# Patient Record
Sex: MALE | Race: WHITE | NOT HISPANIC OR LATINO | Employment: OTHER | URBAN - METROPOLITAN AREA
[De-identification: names, ages, dates, MRNs, and addresses within clinical notes are randomized per-mention and may not be internally consistent; named-entity substitution may affect disease eponyms.]

---

## 2023-10-25 ENCOUNTER — TELEPHONE (OUTPATIENT)
Dept: PRIMARY CARE | Facility: CLINIC | Age: 78
End: 2023-10-25
Payer: MEDICARE

## 2023-10-25 DIAGNOSIS — I48.11 LONGSTANDING PERSISTENT ATRIAL FIBRILLATION (MULTI): Primary | ICD-10-CM

## 2023-10-27 DIAGNOSIS — I48.21 PERMANENT ATRIAL FIBRILLATION (MULTI): Primary | ICD-10-CM

## 2023-10-31 LAB
NON-UH HIE INR: 1.8
NON-UH HIE PROTIME PATIENT: 20 SECONDS (ref 9.8–12.8)

## 2023-11-28 ENCOUNTER — ANTICOAGULATION - WARFARIN VISIT (OUTPATIENT)
Dept: CARDIOLOGY | Facility: CLINIC | Age: 78
End: 2023-11-28
Payer: MEDICARE

## 2023-11-28 DIAGNOSIS — I48.0 PAROXYSMAL ATRIAL FIBRILLATION (MULTI): ICD-10-CM

## 2023-11-28 LAB
INR IN PPP BY COAGULATION ASSAY EXTERNAL: 2.4
NON-UH HIE INR: 2.4
NON-UH HIE PROTIME PATIENT: 27.4 SECONDS (ref 9.8–12.8)
POC INR: 2.4
POC PROTHROMBIN TIME: NORMAL
PROTHROMBIN TIME (PT) IN PPP BY COAGULATION ASSAY EXTERNAL: NORMAL SECONDS

## 2023-11-28 PROCEDURE — 85610 PROTHROMBIN TIME: CPT | Performed by: INTERNAL MEDICINE

## 2023-12-11 ENCOUNTER — OFFICE VISIT (OUTPATIENT)
Dept: CARDIOLOGY | Facility: CLINIC | Age: 78
End: 2023-12-11
Payer: MEDICARE

## 2023-12-11 VITALS
TEMPERATURE: 97.3 F | WEIGHT: 123 LBS | HEART RATE: 72 BPM | SYSTOLIC BLOOD PRESSURE: 122 MMHG | DIASTOLIC BLOOD PRESSURE: 76 MMHG | HEIGHT: 73 IN | BODY MASS INDEX: 16.3 KG/M2

## 2023-12-11 DIAGNOSIS — R94.31 ABNORMAL EKG: Primary | ICD-10-CM

## 2023-12-11 DIAGNOSIS — I10 PRIMARY HYPERTENSION: ICD-10-CM

## 2023-12-11 DIAGNOSIS — I48.20 CHRONIC ATRIAL FIBRILLATION (MULTI): ICD-10-CM

## 2023-12-11 PROCEDURE — 1126F AMNT PAIN NOTED NONE PRSNT: CPT | Performed by: INTERNAL MEDICINE

## 2023-12-11 PROCEDURE — 3078F DIAST BP <80 MM HG: CPT | Performed by: INTERNAL MEDICINE

## 2023-12-11 PROCEDURE — 3074F SYST BP LT 130 MM HG: CPT | Performed by: INTERNAL MEDICINE

## 2023-12-11 PROCEDURE — 1159F MED LIST DOCD IN RCRD: CPT | Performed by: INTERNAL MEDICINE

## 2023-12-11 PROCEDURE — 99214 OFFICE O/P EST MOD 30 MIN: CPT | Performed by: INTERNAL MEDICINE

## 2023-12-11 PROCEDURE — 1036F TOBACCO NON-USER: CPT | Performed by: INTERNAL MEDICINE

## 2023-12-11 RX ORDER — LOSARTAN POTASSIUM 100 MG/1
100 TABLET ORAL DAILY
COMMUNITY

## 2023-12-11 RX ORDER — AMLODIPINE BESYLATE 10 MG/1
10 TABLET ORAL DAILY
COMMUNITY

## 2023-12-11 RX ORDER — TRIAMCINOLONE ACETONIDE 1 MG/G
1 CREAM TOPICAL 2 TIMES DAILY
COMMUNITY
Start: 2023-06-09

## 2023-12-11 RX ORDER — BISOPROLOL FUMARATE 5 MG/1
5 TABLET, FILM COATED ORAL 2 TIMES DAILY
COMMUNITY
End: 2024-03-19

## 2023-12-11 RX ORDER — TRIAMCINOLONE ACETONIDE 1 MG/G
2 OINTMENT TOPICAL 2 TIMES DAILY
COMMUNITY
Start: 2023-03-10 | End: 2024-02-13 | Stop reason: WASHOUT

## 2023-12-11 ASSESSMENT — PAIN SCALES - GENERAL: PAINLEVEL: 0-NO PAIN

## 2023-12-11 NOTE — PROGRESS NOTES
1. Paroxysmal atrial fibrillation, bisoprolol and warfarin  2. Hypertension  3. Atypical chest pains with negative stress test    Patient is a pleasant 78-year-old male with the above-noted pertinent past medical history who presents today as part of the hospital discharge follow-up.  He has significant periorbital (bilateral) ecchymosis he states that that he did not have a fall but believes that he hit his head against the car door, he was seen and evaluated at [facility] and followed by neurosurgery where the CT examination had revealed subdural hematoma, and the recommendation for follow-up CT was made in 2 weeks.  He has no complaints of palpitation today, he is no complaints chest pain or shortness of breath    Vital signs reviewed and stable, BMI of 16.2 which is stable as compared to the prior blood pressure 122/78 mmHg and a heart rate of 72 bpm elderly male with bilateral periorbital ecchymosis that appears to be about 7 days old, heart rate and rhythm are regular S1-S2 are normal no gallop or murmurs, lungs are decreased breath sound but clear, abdomen is scaphoid positive bowel sounds soft and nontender    Subdural hematoma due to injury hitting his head against the car door resulting in subdural hematoma, his anticoagulation warfarin has been hold at this time until further follow-up CT examination, patient was recommended not to start his anticoagulation until the follow-up CT has been performed, patient's vitals are stable today, patient is to return to my clinic in 2 months as previously scheduled.  We will follow-up the CAT scan result and look for recommendation from neurosurgery for Renastart is anticoagulation.  4. Underweight

## 2024-02-13 ENCOUNTER — APPOINTMENT (OUTPATIENT)
Dept: CARDIOLOGY | Facility: CLINIC | Age: 79
End: 2024-02-13
Payer: MEDICARE

## 2024-02-13 ENCOUNTER — OFFICE VISIT (OUTPATIENT)
Dept: PRIMARY CARE | Facility: CLINIC | Age: 79
End: 2024-02-13
Payer: MEDICARE

## 2024-02-13 VITALS
TEMPERATURE: 97 F | DIASTOLIC BLOOD PRESSURE: 66 MMHG | BODY MASS INDEX: 16.57 KG/M2 | HEIGHT: 73 IN | WEIGHT: 125 LBS | HEART RATE: 56 BPM | SYSTOLIC BLOOD PRESSURE: 145 MMHG

## 2024-02-13 DIAGNOSIS — I48.20 CHRONIC ATRIAL FIBRILLATION (MULTI): ICD-10-CM

## 2024-02-13 DIAGNOSIS — I83.019: ICD-10-CM

## 2024-02-13 DIAGNOSIS — Z23 ENCOUNTER FOR IMMUNIZATION: ICD-10-CM

## 2024-02-13 DIAGNOSIS — R63.6 UNDERWEIGHT: ICD-10-CM

## 2024-02-13 DIAGNOSIS — Z13.29 THYROID DISORDER SCREENING: ICD-10-CM

## 2024-02-13 DIAGNOSIS — E46 PROTEIN-CALORIE MALNUTRITION, UNSPECIFIED SEVERITY (MULTI): ICD-10-CM

## 2024-02-13 DIAGNOSIS — Z00.00 HEALTH MAINTENANCE EXAMINATION: Primary | ICD-10-CM

## 2024-02-13 DIAGNOSIS — M25.512 CHRONIC LEFT SHOULDER PAIN: ICD-10-CM

## 2024-02-13 DIAGNOSIS — E55.9 VITAMIN D DEFICIENCY: ICD-10-CM

## 2024-02-13 DIAGNOSIS — G89.29 CHRONIC LEFT SHOULDER PAIN: ICD-10-CM

## 2024-02-13 DIAGNOSIS — I10 ESSENTIAL HYPERTENSION, BENIGN: ICD-10-CM

## 2024-02-13 PROBLEM — I50.40 COMBINED SYSTOLIC AND DIASTOLIC CONGESTIVE HEART FAILURE (MULTI): Status: ACTIVE | Noted: 2024-02-13

## 2024-02-13 PROBLEM — J90 PLEURAL EFFUSION, BILATERAL: Status: ACTIVE | Noted: 2024-02-13

## 2024-02-13 PROBLEM — E87.1 HYPONATREMIA: Status: ACTIVE | Noted: 2024-02-13

## 2024-02-13 PROBLEM — Z86.73 HISTORY OF CEREBROVASCULAR ACCIDENT: Status: ACTIVE | Noted: 2021-12-16

## 2024-02-13 PROBLEM — G43.109 OCULAR MIGRAINE: Status: ACTIVE | Noted: 2024-02-13

## 2024-02-13 PROBLEM — F54 PSYCHOGENIC POLYDIPSIA: Status: ACTIVE | Noted: 2024-02-13

## 2024-02-13 PROBLEM — I73.00 RAYNAUD PHENOMENON: Status: ACTIVE | Noted: 2024-02-13

## 2024-02-13 PROBLEM — R20.9 COLD FINGER WITHOUT PERIPHERAL VASCULAR DISEASE: Status: ACTIVE | Noted: 2024-02-13

## 2024-02-13 PROBLEM — R55 NEAR SYNCOPE: Status: ACTIVE | Noted: 2024-02-13

## 2024-02-13 PROBLEM — E83.42 HYPOMAGNESEMIA: Status: ACTIVE | Noted: 2024-02-13

## 2024-02-13 PROBLEM — I87.2 STASIS DERMATITIS, ACUTE: Status: ACTIVE | Noted: 2024-02-13

## 2024-02-13 PROBLEM — R58 ECCHYMOSIS: Status: ACTIVE | Noted: 2024-02-13

## 2024-02-13 PROBLEM — R20.8 COMPLAINING OF COLD HANDS: Status: ACTIVE | Noted: 2024-02-13

## 2024-02-13 PROBLEM — D64.9 ANEMIA: Status: ACTIVE | Noted: 2024-02-13

## 2024-02-13 PROBLEM — R63.1 PSYCHOGENIC POLYDIPSIA: Status: ACTIVE | Noted: 2024-02-13

## 2024-02-13 PROBLEM — R07.89 ATYPICAL CHEST PAIN: Status: ACTIVE | Noted: 2024-02-13

## 2024-02-13 PROBLEM — D75.89 MACROCYTOSIS: Status: ACTIVE | Noted: 2024-02-13

## 2024-02-13 PROBLEM — R09.82 POST-NASAL DRIP: Status: ACTIVE | Noted: 2024-02-13

## 2024-02-13 PROBLEM — J30.9 ALLERGIC RHINITIS: Status: ACTIVE | Noted: 2024-02-13

## 2024-02-13 PROBLEM — R06.02 SHORTNESS OF BREATH AT REST: Status: ACTIVE | Noted: 2024-02-13

## 2024-02-13 PROBLEM — H91.90 HEARING IMPAIRMENT: Status: ACTIVE | Noted: 2024-02-13

## 2024-02-13 PROBLEM — R19.4 CHANGE IN BOWEL HABITS: Status: ACTIVE | Noted: 2021-07-30

## 2024-02-13 PROBLEM — N40.0 ENLARGED PROSTATE WITHOUT LOWER URINARY TRACT SYMPTOMS (LUTS): Status: ACTIVE | Noted: 2024-02-13

## 2024-02-13 PROBLEM — I87.309 CHRONIC PERIPHERAL VENOUS HYPERTENSION: Status: ACTIVE | Noted: 2024-02-13

## 2024-02-13 PROBLEM — R93.89 ABNORMAL CHEST X-RAY: Status: ACTIVE | Noted: 2024-02-13

## 2024-02-13 PROBLEM — I65.29 CAROTID ATHEROSCLEROSIS: Status: ACTIVE | Noted: 2024-02-13

## 2024-02-13 PROBLEM — H61.20 CERUMEN IMPACTION: Status: ACTIVE | Noted: 2024-02-13

## 2024-02-13 PROBLEM — M62.830 BACK MUSCLE SPASM: Status: ACTIVE | Noted: 2024-02-13

## 2024-02-13 PROBLEM — M54.9 BACKACHE: Status: ACTIVE | Noted: 2024-02-13

## 2024-02-13 PROBLEM — J02.9 SORE THROAT: Status: ACTIVE | Noted: 2024-02-13

## 2024-02-13 PROBLEM — K58.9 IBS (IRRITABLE BOWEL SYNDROME): Status: ACTIVE | Noted: 2024-02-13

## 2024-02-13 PROBLEM — I48.0 PAROXYSMAL ATRIAL FIBRILLATION (MULTI): Status: ACTIVE | Noted: 2021-12-16

## 2024-02-13 PROBLEM — M79.89 LEG SWELLING: Status: ACTIVE | Noted: 2024-02-13

## 2024-02-13 PROCEDURE — 90677 PCV20 VACCINE IM: CPT | Performed by: INTERNAL MEDICINE

## 2024-02-13 PROCEDURE — 1159F MED LIST DOCD IN RCRD: CPT | Performed by: INTERNAL MEDICINE

## 2024-02-13 PROCEDURE — 1036F TOBACCO NON-USER: CPT | Performed by: INTERNAL MEDICINE

## 2024-02-13 PROCEDURE — 1170F FXNL STATUS ASSESSED: CPT | Performed by: INTERNAL MEDICINE

## 2024-02-13 PROCEDURE — 1160F RVW MEDS BY RX/DR IN RCRD: CPT | Performed by: INTERNAL MEDICINE

## 2024-02-13 PROCEDURE — G0439 PPPS, SUBSEQ VISIT: HCPCS | Performed by: INTERNAL MEDICINE

## 2024-02-13 PROCEDURE — 3078F DIAST BP <80 MM HG: CPT | Performed by: INTERNAL MEDICINE

## 2024-02-13 PROCEDURE — 1126F AMNT PAIN NOTED NONE PRSNT: CPT | Performed by: INTERNAL MEDICINE

## 2024-02-13 PROCEDURE — 3077F SYST BP >= 140 MM HG: CPT | Performed by: INTERNAL MEDICINE

## 2024-02-13 PROCEDURE — G0009 ADMIN PNEUMOCOCCAL VACCINE: HCPCS | Performed by: INTERNAL MEDICINE

## 2024-02-13 RX ORDER — ACETAMINOPHEN 500 MG
5000 TABLET ORAL DAILY
Qty: 90 TABLET | Refills: 3 | Status: SHIPPED | OUTPATIENT
Start: 2024-02-13 | End: 2025-02-12

## 2024-02-13 ASSESSMENT — ENCOUNTER SYMPTOMS
DEPRESSION: 0
COUGH: 0
LIGHT-HEADEDNESS: 0
LOSS OF SENSATION IN FEET: 0
OCCASIONAL FEELINGS OF UNSTEADINESS: 0
SORE THROAT: 0
CHILLS: 0
BLOOD IN STOOL: 0
ABDOMINAL PAIN: 0
CONSTIPATION: 0
DIARRHEA: 0
CONFUSION: 0
DIZZINESS: 0
DYSURIA: 0
NAUSEA: 0
UNEXPECTED WEIGHT CHANGE: 0
VOMITING: 0
PALPITATIONS: 0
SHORTNESS OF BREATH: 0
FEVER: 0
AGITATION: 0

## 2024-02-13 ASSESSMENT — COLUMBIA-SUICIDE SEVERITY RATING SCALE - C-SSRS
2. HAVE YOU ACTUALLY HAD ANY THOUGHTS OF KILLING YOURSELF?: NO
1. IN THE PAST MONTH, HAVE YOU WISHED YOU WERE DEAD OR WISHED YOU COULD GO TO SLEEP AND NOT WAKE UP?: NO
6. HAVE YOU EVER DONE ANYTHING, STARTED TO DO ANYTHING, OR PREPARED TO DO ANYTHING TO END YOUR LIFE?: NO

## 2024-02-13 ASSESSMENT — ACTIVITIES OF DAILY LIVING (ADL)
BATHING: INDEPENDENT
FEEDING YOURSELF: INDEPENDENT
GROCERY_SHOPPING: INDEPENDENT
DOING_HOUSEWORK: INDEPENDENT
DRESSING YOURSELF: INDEPENDENT
WALKS IN HOME: INDEPENDENT
TOILETING: INDEPENDENT
MANAGING_FINANCES: INDEPENDENT
HEARING - LEFT EAR: FUNCTIONAL
TAKING_MEDICATION: INDEPENDENT
ADEQUATE_TO_COMPLETE_ADL: YES
HEARING - RIGHT EAR: FUNCTIONAL
JUDGMENT_ADEQUATE_SAFELY_COMPLETE_DAILY_ACTIVITIES: YES
GROOMING: INDEPENDENT
PATIENT'S MEMORY ADEQUATE TO SAFELY COMPLETE DAILY ACTIVITIES?: YES

## 2024-02-13 ASSESSMENT — PATIENT HEALTH QUESTIONNAIRE - PHQ9
SUM OF ALL RESPONSES TO PHQ9 QUESTIONS 1 AND 2: 0
2. FEELING DOWN, DEPRESSED OR HOPELESS: NOT AT ALL
1. LITTLE INTEREST OR PLEASURE IN DOING THINGS: NOT AT ALL

## 2024-02-13 NOTE — PROGRESS NOTES
"Subjective   Reason for Visit: Yohan Glez is an 78 y.o. male here for a Medicare Wellness visit.          Reviewed all medications by prescribing practitioner or clinical pharmacist (such as prescriptions, OTCs, herbal therapies and supplements) and documented in the medical record.    HPI  Patient is here for AWV.  Patient was taken off blood thinners due to a brain bleed he sustained from bumping his head on a car door. He had a subdural hematoma. He was evaluated by a neurosurgeon and was told not to take warfarin ever again. Patient is establishing care with a new cardiologist at [facility]. He has an appointment with cardiology in March,  Patient having left arm pain since he had the flu shot in November 1st. If he lifts anything more than 3 pounds. No arm weakness or numbness. It is not getting worse or better., Pain is present with lifting the shoulder.  Denies neck pain, patient is up-to-date on age and gender recommended screening.  Patient up-to-date on age and gender recommended vaccinations with exception of Pneumonia vaccine which will be updated also recommend to get the shingles vaccine at his pharmacy.    Patient Care Team:  Ricardo Buitrago DO as PCP - General     Review of Systems   Constitutional:  Negative for chills, fever and unexpected weight change.   HENT:  Negative for sore throat.    Eyes:  Negative for visual disturbance.   Respiratory:  Negative for cough and shortness of breath.    Cardiovascular:  Negative for chest pain, palpitations and leg swelling.   Gastrointestinal:  Negative for abdominal pain, blood in stool, constipation, diarrhea, nausea and vomiting.   Genitourinary:  Negative for dysuria.   Skin:  Negative for rash.   Neurological:  Negative for dizziness, syncope and light-headedness.   Psychiatric/Behavioral:  Negative for agitation and confusion.        Objective   Vitals:  /66   Pulse 56   Temp 36.1 °C (97 °F) (Temporal)   Ht 1.854 m (6' 1\")   Wt 56.7 kg " (125 lb)   BMI 16.49 kg/m²       Physical Exam  Vitals and nursing note reviewed.   Constitutional:       General: He is not in acute distress.     Appearance: He is not ill-appearing, toxic-appearing or diaphoretic.      Comments: underweight   HENT:      Head: Normocephalic and atraumatic.      Mouth/Throat:      Mouth: Mucous membranes are moist.      Pharynx: Oropharynx is clear. No oropharyngeal exudate.   Eyes:      Extraocular Movements: Extraocular movements intact.      Pupils: Pupils are equal, round, and reactive to light.   Cardiovascular:      Rate and Rhythm: Normal rate and regular rhythm.      Pulses: Normal pulses.      Heart sounds: Normal heart sounds.   Pulmonary:      Effort: No respiratory distress.      Breath sounds: Normal breath sounds. No wheezing, rhonchi or rales.   Abdominal:      General: Abdomen is flat. Bowel sounds are normal. There is no distension.      Palpations: Abdomen is soft.      Tenderness: There is no abdominal tenderness. There is no rebound.   Musculoskeletal:      Cervical back: Neck supple. No tenderness.      Right lower leg: No edema.      Left lower leg: No edema.   Lymphadenopathy:      Cervical: No cervical adenopathy.   Skin:     General: Skin is warm and dry.   Neurological:      General: No focal deficit present.      Mental Status: He is alert and oriented to person, place, and time.      Cranial Nerves: No cranial nerve deficit.   Psychiatric:         Mood and Affect: Mood normal.         Behavior: Behavior normal.         Thought Content: Thought content normal.         Judgment: Judgment normal.         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

## 2024-02-29 ENCOUNTER — TELEPHONE (OUTPATIENT)
Dept: PRIMARY CARE | Facility: CLINIC | Age: 79
End: 2024-02-29
Payer: MEDICARE

## 2024-02-29 PROBLEM — M25.512 CHRONIC LEFT SHOULDER PAIN: Status: ACTIVE | Noted: 2024-02-29

## 2024-02-29 PROBLEM — Z13.29 THYROID DISORDER SCREENING: Status: ACTIVE | Noted: 2024-02-29

## 2024-02-29 PROBLEM — R63.6 UNDERWEIGHT: Status: ACTIVE | Noted: 2024-02-29

## 2024-02-29 PROBLEM — G89.29 CHRONIC LEFT SHOULDER PAIN: Status: ACTIVE | Noted: 2024-02-29

## 2024-02-29 PROBLEM — I48.20 CHRONIC ATRIAL FIBRILLATION (MULTI): Status: ACTIVE | Noted: 2024-02-29

## 2024-03-18 DIAGNOSIS — I10 ESSENTIAL (PRIMARY) HYPERTENSION: ICD-10-CM

## 2024-03-19 RX ORDER — BISOPROLOL FUMARATE 5 MG/1
5 TABLET, FILM COATED ORAL 2 TIMES DAILY
Qty: 180 TABLET | Refills: 1 | Status: SHIPPED | OUTPATIENT
Start: 2024-03-19

## 2024-08-06 LAB
NON-UH HIE A/G RATIO: 1.3
NON-UH HIE ALB: 3.9 G/DL (ref 3.4–5)
NON-UH HIE ALK PHOS: 62 UNIT/L (ref 45–117)
NON-UH HIE BASO COUNT: 0.04 X1000 (ref 0–0.2)
NON-UH HIE BASOS %: 0.7 %
NON-UH HIE BILIRUBIN, TOTAL: 0.6 MG/DL (ref 0.3–1.2)
NON-UH HIE BUN/CREAT RATIO: 27.5
NON-UH HIE BUN: 22 MG/DL (ref 9–23)
NON-UH HIE CALCIUM: 9.6 MG/DL (ref 8.7–10.4)
NON-UH HIE CALCULATED OSMOLALITY: 283 MOSM/KG (ref 275–295)
NON-UH HIE CHLORIDE: 104 MMOL/L (ref 98–107)
NON-UH HIE CO2, VENOUS: 31 MMOL/L (ref 20–31)
NON-UH HIE CREATININE: 0.8 MG/DL (ref 0.6–1.1)
NON-UH HIE DIFF?: NO
NON-UH HIE EOS COUNT: 0.18 X1000 (ref 0–0.5)
NON-UH HIE EOSIN %: 3.3 %
NON-UH HIE GFR AA: >60
NON-UH HIE GLOBULIN: 3 G/DL
NON-UH HIE GLOMERULAR FILTRATION RATE: >60 ML/MIN/1.73M?
NON-UH HIE GLUCOSE: 95 MG/DL (ref 74–106)
NON-UH HIE GOT: 24 UNIT/L (ref 15–37)
NON-UH HIE GPT: 21 UNIT/L (ref 10–49)
NON-UH HIE HCT: 36.6 % (ref 41–52)
NON-UH HIE HGB: 12.4 G/DL (ref 13.5–17.5)
NON-UH HIE INSTR WBC: 5.5
NON-UH HIE K: 4.6 MMOL/L (ref 3.5–5.1)
NON-UH HIE LYMPH %: 19 %
NON-UH HIE LYMPH COUNT: 1.04 X1000 (ref 1.2–4.8)
NON-UH HIE MCH: 35.4 PG (ref 27–34)
NON-UH HIE MCHC: 33.8 G/DL (ref 32–37)
NON-UH HIE MCV: 104.8 FL (ref 80–100)
NON-UH HIE MONO %: 10.9 %
NON-UH HIE MONO COUNT: 0.6 X1000 (ref 0.1–1)
NON-UH HIE MPV: 7.1 FL (ref 7.4–10.4)
NON-UH HIE NA: 140 MMOL/L (ref 135–145)
NON-UH HIE NEUTROPHIL %: 66.2 %
NON-UH HIE NEUTROPHIL COUNT (ANC): 3.62 X1000 (ref 1.4–8.8)
NON-UH HIE NUCLEATED RBC: 0 /100WBC
NON-UH HIE PLATELET: 270 X10 (ref 150–450)
NON-UH HIE RBC: 3.49 X10 (ref 4.7–6.1)
NON-UH HIE RDW: 12.8 % (ref 11.5–14.5)
NON-UH HIE TOTAL PROTEIN: 6.9 G/DL (ref 5.7–8.2)
NON-UH HIE TSH: 2.46 UIU/ML (ref 0.55–4.78)
NON-UH HIE VIT D 25: 28 NG/ML
NON-UH HIE WBC: 5.5 X10 (ref 4.5–11)

## 2024-08-13 ENCOUNTER — APPOINTMENT (OUTPATIENT)
Dept: PRIMARY CARE | Facility: CLINIC | Age: 79
End: 2024-08-13
Payer: MEDICARE

## 2024-08-13 VITALS
HEART RATE: 59 BPM | WEIGHT: 126 LBS | SYSTOLIC BLOOD PRESSURE: 147 MMHG | TEMPERATURE: 98 F | DIASTOLIC BLOOD PRESSURE: 81 MMHG | BODY MASS INDEX: 16.62 KG/M2

## 2024-08-13 DIAGNOSIS — I10 ESSENTIAL HYPERTENSION, BENIGN: ICD-10-CM

## 2024-08-13 DIAGNOSIS — L21.9 SEBORRHEIC DERMATITIS OF SCALP: ICD-10-CM

## 2024-08-13 DIAGNOSIS — H61.23 BILATERAL IMPACTED CERUMEN: ICD-10-CM

## 2024-08-13 DIAGNOSIS — E55.9 VITAMIN D DEFICIENCY: ICD-10-CM

## 2024-08-13 DIAGNOSIS — R63.6 UNDERWEIGHT: ICD-10-CM

## 2024-08-13 DIAGNOSIS — E46 PROTEIN-CALORIE MALNUTRITION, UNSPECIFIED SEVERITY (MULTI): ICD-10-CM

## 2024-08-13 DIAGNOSIS — D64.9 ANEMIA, UNSPECIFIED TYPE: Primary | ICD-10-CM

## 2024-08-13 DIAGNOSIS — D75.89 MACROCYTOSIS: ICD-10-CM

## 2024-08-13 LAB
NON-UH HIE FERRITIN: 176 NG/ML (ref 22–322)
NON-UH HIE FOLATE: 23.5 NG/ML (ref 5.4–17.5)
NON-UH HIE HCT: 32.2 % (ref 41–52)
NON-UH HIE IMMATURE RETIC FRACTION: 0.51 (ref 0.2–0.5)
NON-UH HIE IRON: 123 UG/DL (ref 65–175)
NON-UH HIE RBC: 3.07 X10 (ref 4.7–6.1)
NON-UH HIE RETIC ABSOLUTE: 16 X10 (ref 22–110)
NON-UH HIE RETIC CORRECTED: 0.4 %
NON-UH HIE RETIC COUNT: 0.5 % (ref 0.5–2.5)
NON-UH HIE SATURATION: 42.7 % (ref 20–50)
NON-UH HIE TIBC: 288 UG/ML (ref 250–425)
NON-UH HIE VITAMIN B12: 541 PG/ML (ref 211–911)

## 2024-08-13 PROCEDURE — 1159F MED LIST DOCD IN RCRD: CPT | Performed by: INTERNAL MEDICINE

## 2024-08-13 PROCEDURE — 1160F RVW MEDS BY RX/DR IN RCRD: CPT | Performed by: INTERNAL MEDICINE

## 2024-08-13 PROCEDURE — 1036F TOBACCO NON-USER: CPT | Performed by: INTERNAL MEDICINE

## 2024-08-13 PROCEDURE — 3077F SYST BP >= 140 MM HG: CPT | Performed by: INTERNAL MEDICINE

## 2024-08-13 PROCEDURE — 99214 OFFICE O/P EST MOD 30 MIN: CPT | Performed by: INTERNAL MEDICINE

## 2024-08-13 PROCEDURE — 3079F DIAST BP 80-89 MM HG: CPT | Performed by: INTERNAL MEDICINE

## 2024-08-13 PROCEDURE — 69210 REMOVE IMPACTED EAR WAX UNI: CPT | Performed by: INTERNAL MEDICINE

## 2024-08-13 RX ORDER — KETOCONAZOLE 20 MG/G
CREAM TOPICAL
COMMUNITY
Start: 2024-05-03

## 2024-08-13 RX ORDER — CICLOPIROX 1 G/100ML
1 SHAMPOO TOPICAL
COMMUNITY
Start: 2024-07-23 | End: 2024-10-21

## 2024-08-13 RX ORDER — UREA 10 %
LOTION (ML) TOPICAL
COMMUNITY
Start: 2024-05-03

## 2024-08-13 RX ORDER — CLOBETASOL PROPIONATE 0.5 MG/ML
1 SOLUTION TOPICAL
COMMUNITY
Start: 2024-07-23 | End: 2024-10-21

## 2024-08-13 RX ORDER — KETOCONAZOLE 20 MG/ML
SHAMPOO, SUSPENSION TOPICAL
COMMUNITY
Start: 2024-05-03

## 2024-08-13 ASSESSMENT — ENCOUNTER SYMPTOMS
CONSTIPATION: 0
SHORTNESS OF BREATH: 0
VOMITING: 0
DYSURIA: 0
NAUSEA: 0
CHILLS: 0
FEVER: 0
DIZZINESS: 0
HEMATURIA: 0
DIARRHEA: 0
DYSPHORIC MOOD: 0
ABDOMINAL PAIN: 0
ADENOPATHY: 0
NERVOUS/ANXIOUS: 0
LIGHT-HEADEDNESS: 0
PALPITATIONS: 0
COUGH: 0
AGITATION: 0
ANAL BLEEDING: 0
SORE THROAT: 0
BLOOD IN STOOL: 0

## 2024-08-13 NOTE — PROGRESS NOTES
Subjective   Patient ID: Yohan Glez is a 79 y.o. male who presents for Weight Management.    HPI  Patient presents for weight management and eczema of the left ear. Patient has seen 2 dermatologists and ENT for this condition.  Patient sees Dr. Rosales for history of atrial fibrillation. Patient has history of brain bleed and taken off of blood thinners. Denies problems swallowing. Denies alcohol use or smoking. Denies history of eating disorders.  His weight has remained stable.  He does not take boost regularly though is recommended to supplement his diet.  Has no difficulty swallowing or early satiety.  Is up-to-date on colonoscopy.  Is up-to-date on urinalysis testing that we did in August.  Denies any history of cancer.  Denies any fever, chills, night sweats.  Denies any chest pain or shortness of breath.  We reviewed his labs he has a mild anemia macrocytic anemia.  Denies any alcohol use. Has normal thyroid level normal CMP normal vitamin D    Review of Systems   Constitutional:  Negative for chills and fever.   HENT:  Negative for ear discharge, ear pain and sore throat.    Eyes:  Negative for visual disturbance.   Respiratory:  Negative for cough and shortness of breath.    Cardiovascular:  Negative for chest pain, palpitations and leg swelling.   Gastrointestinal:  Negative for abdominal pain, anal bleeding, blood in stool, constipation, diarrhea, nausea and vomiting.   Genitourinary:  Negative for dysuria and hematuria.   Skin:  Positive for rash (ear itching).   Neurological:  Negative for dizziness, syncope and light-headedness.   Hematological:  Negative for adenopathy.   Psychiatric/Behavioral:  Negative for agitation and dysphoric mood. The patient is not nervous/anxious.        Objective   /81   Pulse 59   Temp 36.7 °C (98 °F) (Temporal)   Wt 57.2 kg (126 lb)   BMI 16.62 kg/m²     Physical Exam  Vitals and nursing note reviewed.   Constitutional:       General: He is not in acute  distress.     Appearance: He is not ill-appearing, toxic-appearing or diaphoretic.   HENT:      Head: Normocephalic and atraumatic.      Right Ear: There is impacted cerumen.      Left Ear: There is impacted cerumen.      Mouth/Throat:      Mouth: Mucous membranes are moist.      Pharynx: Oropharynx is clear. No oropharyngeal exudate.   Eyes:      Pupils: Pupils are equal, round, and reactive to light.   Cardiovascular:      Rate and Rhythm: Normal rate and regular rhythm.      Heart sounds: Normal heart sounds.   Pulmonary:      Effort: Pulmonary effort is normal. No respiratory distress.      Breath sounds: Normal breath sounds. No wheezing, rhonchi or rales.   Abdominal:      General: There is no distension.      Palpations: Abdomen is soft. There is no mass.      Tenderness: There is no abdominal tenderness. There is no guarding.   Musculoskeletal:      Cervical back: Neck supple. No tenderness.      Right lower leg: No edema.      Left lower leg: No edema.   Lymphadenopathy:      Cervical: No cervical adenopathy.   Skin:     General: Skin is warm and dry.      Findings: Rash (dry flakey skin of both ear canals and scalp) present.   Neurological:      General: No focal deficit present.      Mental Status: He is alert and oriented to person, place, and time.      Cranial Nerves: No cranial nerve deficit.   Psychiatric:         Mood and Affect: Mood normal.         Behavior: Behavior normal.         Thought Content: Thought content normal.         Judgment: Judgment normal.       Assessment/Plan   Problem List Items Addressed This Visit             ICD-10-CM    Anemia - Primary D64.9    Relevant Orders    Iron and TIBC    Ferritin    Vitamin B12    Reticulocytes    Serum Protein Electrophoresis + Immunofixation    Folate    Cerumen impaction H61.20    Essential hypertension, benign I10    Macrocytosis D75.89    Relevant Orders    Serum Protein Electrophoresis + Immunofixation    Folate    Vitamin D deficiency  E55.9    Protein-calorie malnutrition, unspecified severity (Multi) E46    Relevant Orders    Referral to Nutrition Services    Underweight R63.6    Relevant Orders    Referral to Nutrition Services     Anemia: Chronic, stable will check iron panel, ferritin, vitamin B12, reticulocyte count, serum protein electrophoresis given his macrocytosis and folate level.  We will consider referral to hematology and oncology if his workup is unrevealing given his underweight status    Cerumen impaction: We did use a curette to clear serum impaction from the bilateral ears and patient tolerated well also used irrigation with water.    Essential hypertension: Blood pressure elevated in the office today normally well controlled.  Will continue to monitor    Vitamin D deficiency: Recommend 5000 national units of vitamin D3 daily he thinks he is taking a lower dose of this at home is going to check    Underweight/protein calorie malnutrition: We have recommended boost daily and have referred him to nutrition services.  He is up-to-date on age and gender recommended cancer screening.  No obvious cause for his underweight status at this time.  States he has been this way for about 20 years per the patient.    Seborrheic dermatitis: Recommend patient follow the recommendations of dermatology and ENT with ketoconazole and steroid cream

## 2024-08-16 LAB
NON-UH HIE EER MONOCLONAL PROTEIN STUDY, SERUM: NORMAL
NON-UH HIE IMMUNOFIXATION: NORMAL
NON-UH HIE MONOCLONAL PROTEIN: NORMAL G/DL
NON-UH HIE SPE ALBUMIN: 3.91 G/DL (ref 3.75–5.01)
NON-UH HIE SPE ALPHA 1 GLOBULIN: 0.23 G/DL (ref 0.19–0.46)
NON-UH HIE SPE ALPHA 2 GLOBULIN: 0.64 G/DL (ref 0.48–1.05)
NON-UH HIE SPE BETA GLOBULIN: 0.76 G/DL (ref 0.48–1.1)
NON-UH HIE SPE GAMMA GLOBULIN: 0.77 G/DL (ref 0.62–1.51)
NON-UH HIE SPE TOTAL PROTEIN: 6.3 G/DL (ref 6.3–8.2)
NON-UH HIE SPEP/IFE INTERP: NORMAL

## 2024-09-03 ENCOUNTER — TELEPHONE (OUTPATIENT)
Dept: CARDIOLOGY | Facility: CLINIC | Age: 79
End: 2024-09-03
Payer: MEDICARE

## 2024-09-03 DIAGNOSIS — I10 ESSENTIAL (PRIMARY) HYPERTENSION: ICD-10-CM

## 2024-09-08 DIAGNOSIS — I10 ESSENTIAL (PRIMARY) HYPERTENSION: ICD-10-CM

## 2024-09-09 DIAGNOSIS — D64.9 ANEMIA, UNSPECIFIED TYPE: ICD-10-CM

## 2024-09-09 DIAGNOSIS — R63.6 UNDERWEIGHT: Primary | ICD-10-CM

## 2024-09-09 RX ORDER — LOSARTAN POTASSIUM 100 MG/1
100 TABLET ORAL DAILY
Qty: 90 TABLET | Refills: 1 | Status: SHIPPED | OUTPATIENT
Start: 2024-09-09 | End: 2025-03-08

## 2024-09-09 RX ORDER — AMLODIPINE BESYLATE 10 MG/1
10 TABLET ORAL DAILY
Qty: 90 TABLET | Refills: 1 | Status: SHIPPED | OUTPATIENT
Start: 2024-09-09 | End: 2025-03-08

## 2024-09-09 RX ORDER — BISOPROLOL FUMARATE 5 MG/1
5 TABLET, FILM COATED ORAL 2 TIMES DAILY
Qty: 180 TABLET | Refills: 1 | Status: SHIPPED | OUTPATIENT
Start: 2024-09-09

## 2024-09-12 RX ORDER — LOSARTAN POTASSIUM 100 MG/1
100 TABLET ORAL DAILY
Qty: 90 TABLET | Refills: 1 | OUTPATIENT
Start: 2024-09-12

## 2024-11-04 ENCOUNTER — CLINICAL SUPPORT (OUTPATIENT)
Dept: PRIMARY CARE | Facility: CLINIC | Age: 79
End: 2024-11-04
Payer: MEDICARE

## 2024-11-04 DIAGNOSIS — Z23 NEED FOR VACCINATION: ICD-10-CM

## 2024-11-04 PROCEDURE — G0008 ADMIN INFLUENZA VIRUS VAC: HCPCS | Performed by: INTERNAL MEDICINE

## 2024-11-04 PROCEDURE — 90662 IIV NO PRSV INCREASED AG IM: CPT | Performed by: INTERNAL MEDICINE

## 2024-11-04 ASSESSMENT — ANXIETY QUESTIONNAIRES
IF YOU CHECKED OFF ANY PROBLEMS ON THIS QUESTIONNAIRE, HOW DIFFICULT HAVE THESE PROBLEMS MADE IT FOR YOU TO DO YOUR WORK, TAKE CARE OF THINGS AT HOME, OR GET ALONG WITH OTHER PEOPLE: NOT DIFFICULT AT ALL

## 2024-11-20 ENCOUNTER — PATIENT OUTREACH (OUTPATIENT)
Dept: CARE COORDINATION | Facility: CLINIC | Age: 79
End: 2024-11-20
Payer: MEDICARE

## 2024-11-20 NOTE — PROGRESS NOTES
Referral received for  NUTR education due to protein calorie malnutrition from  Ricardo Buitrago DO . Attempted to reach pt at listed number. Voice message was left with contact information for return call.     Thank you,  Shaylee Scott M.Ed, RDN, LD, [facility]  Registered Dietitian, Certified Diabetes Care and    [phone]    
Speaking Coherently

## 2024-12-02 ENCOUNTER — PATIENT OUTREACH (OUTPATIENT)
Dept: ENDOCRINOLOGY | Facility: CLINIC | Age: 79
End: 2024-12-02
Payer: MEDICARE

## 2024-12-02 NOTE — PROGRESS NOTES
Referral received for  NUTR Education from  Ricardo Buitrago DO . Attempted to reach pt at listed number. Voice message was left with contact information for return call.     Thank you,  Shaylee Scott M.Ed, RDN, LD, [facility]  Registered Dietitian, Certified Diabetes Care and    [phone]

## 2025-02-18 ENCOUNTER — APPOINTMENT (OUTPATIENT)
Dept: PRIMARY CARE | Facility: CLINIC | Age: 80
End: 2025-02-18
Payer: MEDICARE

## 2025-02-18 VITALS
HEART RATE: 57 BPM | HEIGHT: 73 IN | WEIGHT: 128 LBS | BODY MASS INDEX: 16.96 KG/M2 | SYSTOLIC BLOOD PRESSURE: 123 MMHG | DIASTOLIC BLOOD PRESSURE: 66 MMHG | TEMPERATURE: 97.3 F

## 2025-02-18 DIAGNOSIS — L21.9 SEBORRHEIC DERMATITIS OF SCALP: ICD-10-CM

## 2025-02-18 DIAGNOSIS — D75.89 MACROCYTOSIS: ICD-10-CM

## 2025-02-18 DIAGNOSIS — D64.9 ANEMIA, UNSPECIFIED TYPE: ICD-10-CM

## 2025-02-18 DIAGNOSIS — Z13.6 ENCOUNTER FOR LIPID SCREENING FOR CARDIOVASCULAR DISEASE: ICD-10-CM

## 2025-02-18 DIAGNOSIS — Z13.29 THYROID DISORDER SCREENING: ICD-10-CM

## 2025-02-18 DIAGNOSIS — E55.9 VITAMIN D DEFICIENCY: ICD-10-CM

## 2025-02-18 DIAGNOSIS — H61.22 IMPACTED CERUMEN OF LEFT EAR: ICD-10-CM

## 2025-02-18 DIAGNOSIS — Z00.00 HEALTH MAINTENANCE EXAMINATION: Primary | ICD-10-CM

## 2025-02-18 DIAGNOSIS — I10 ESSENTIAL HYPERTENSION, BENIGN: ICD-10-CM

## 2025-02-18 DIAGNOSIS — I10 ESSENTIAL (PRIMARY) HYPERTENSION: ICD-10-CM

## 2025-02-18 DIAGNOSIS — Z13.220 ENCOUNTER FOR LIPID SCREENING FOR CARDIOVASCULAR DISEASE: ICD-10-CM

## 2025-02-18 PROCEDURE — G0439 PPPS, SUBSEQ VISIT: HCPCS | Performed by: INTERNAL MEDICINE

## 2025-02-18 PROCEDURE — 1123F ACP DISCUSS/DSCN MKR DOCD: CPT | Performed by: INTERNAL MEDICINE

## 2025-02-18 PROCEDURE — 69210 REMOVE IMPACTED EAR WAX UNI: CPT | Performed by: INTERNAL MEDICINE

## 2025-02-18 PROCEDURE — 99213 OFFICE O/P EST LOW 20 MIN: CPT | Performed by: INTERNAL MEDICINE

## 2025-02-18 PROCEDURE — 1170F FXNL STATUS ASSESSED: CPT | Performed by: INTERNAL MEDICINE

## 2025-02-18 PROCEDURE — 1159F MED LIST DOCD IN RCRD: CPT | Performed by: INTERNAL MEDICINE

## 2025-02-18 PROCEDURE — 1160F RVW MEDS BY RX/DR IN RCRD: CPT | Performed by: INTERNAL MEDICINE

## 2025-02-18 PROCEDURE — 3078F DIAST BP <80 MM HG: CPT | Performed by: INTERNAL MEDICINE

## 2025-02-18 PROCEDURE — 1036F TOBACCO NON-USER: CPT | Performed by: INTERNAL MEDICINE

## 2025-02-18 PROCEDURE — 3074F SYST BP LT 130 MM HG: CPT | Performed by: INTERNAL MEDICINE

## 2025-02-18 PROCEDURE — 1158F ADVNC CARE PLAN TLK DOCD: CPT | Performed by: INTERNAL MEDICINE

## 2025-02-18 RX ORDER — KETOCONAZOLE 20 MG/ML
SHAMPOO, SUSPENSION TOPICAL 3 TIMES WEEKLY
Qty: 120 ML | Refills: 2 | Status: SHIPPED | OUTPATIENT
Start: 2025-02-19

## 2025-02-18 RX ORDER — BISOPROLOL FUMARATE 5 MG/1
5 TABLET, FILM COATED ORAL 2 TIMES DAILY
Qty: 180 TABLET | Refills: 1 | Status: SHIPPED | OUTPATIENT
Start: 2025-02-18

## 2025-02-18 RX ORDER — LOSARTAN POTASSIUM 100 MG/1
100 TABLET ORAL DAILY
Qty: 90 TABLET | Refills: 1 | Status: SHIPPED | OUTPATIENT
Start: 2025-02-18 | End: 2025-08-17

## 2025-02-18 RX ORDER — AMLODIPINE BESYLATE 10 MG/1
10 TABLET ORAL DAILY
Qty: 90 TABLET | Refills: 1 | Status: SHIPPED | OUTPATIENT
Start: 2025-02-18 | End: 2025-08-17

## 2025-02-18 ASSESSMENT — ACTIVITIES OF DAILY LIVING (ADL)
BATHING: INDEPENDENT
TAKING_MEDICATION: INDEPENDENT
MANAGING_FINANCES: INDEPENDENT
GROCERY_SHOPPING: INDEPENDENT
DOING_HOUSEWORK: INDEPENDENT
DRESSING: INDEPENDENT

## 2025-02-18 ASSESSMENT — PATIENT HEALTH QUESTIONNAIRE - PHQ9
1. LITTLE INTEREST OR PLEASURE IN DOING THINGS: NOT AT ALL
2. FEELING DOWN, DEPRESSED OR HOPELESS: NOT AT ALL
SUM OF ALL RESPONSES TO PHQ9 QUESTIONS 1 AND 2: 0

## 2025-02-18 ASSESSMENT — ENCOUNTER SYMPTOMS
DIZZINESS: 0
LIGHT-HEADEDNESS: 0
VOMITING: 0
CONSTIPATION: 0
CHILLS: 0
SORE THROAT: 0
CONFUSION: 0
NAUSEA: 0
SHORTNESS OF BREATH: 0
BLOOD IN STOOL: 0
FEVER: 0
ABDOMINAL PAIN: 0
COUGH: 0
DYSURIA: 0
AGITATION: 0
DIARRHEA: 0
UNEXPECTED WEIGHT CHANGE: 0

## 2025-02-18 NOTE — ASSESSMENT & PLAN NOTE
Orders:    ketoconazole (NIZOral) 2 % shampoo; Apply topically 3 (three) times a week. USE ON HAIR THREE TIMES WEEKLY, LATHER IN AND LEAVE ON FOR FIVE TO TEN MINUTES BEFORE WASHING OUT.

## 2025-02-18 NOTE — PROGRESS NOTES
Subjective   Reason for Visit: Yohan Glez is an 79 y.o. male here for a Medicare Wellness visit.     Past Medical, Surgical, and Family History reviewed and updated in chart.    Reviewed all medications by prescribing practitioner or clinical pharmacist (such as prescriptions, OTCs, herbal therapies and supplements) and documented in the medical record.    HPI patient is a 79-year-old male presents to the office today for annual wellness visit.  He is up-to-date on age and gender recommended screening.  He is up-to-date on age and gender recommended vaccinations exception of shingles vaccine and RSV which she is recommend to get at his pharmacy.  He is up-to-date on influenza and pneumonia vaccinations.  We reviewed his last labs with him that he had completed in August.  Based upon his persistently low red blood cell count microcytosis abnormal absolute reticulocyte I recommend him seeing hematology for further evaluation.  Explained the patient could have an issue with bone marrow that is contributing to his anemia and macrocytosis.  This could be a type of myelodysplastic syndrome versus other bone marrow abnormality.  Patient aware of this risk wants to hold off on seeing hematology at this time.  He will be due for repeat blood work in August.  Does need refills on blood pressure medication at this time blood pressures are well-controlled.  States he has had an issue with a rash on his scalp did not use a seborrheic dermatitis shampoo it was recommended by dermatology.  We strongly recommend him to use this and have rewritten a prescription for him.  Also complains of a blocked left ear with wax.    Patient Care Team:  Ricardo Buitrago DO as PCP - General  Ricardo Buitrago DO as PCP - [facility] Att[facility]     Review of Systems   Constitutional:  Negative for chills, fever and unexpected weight change.   HENT:  Negative for sore throat.    Eyes:  Negative for visual disturbance.   Respiratory:   "Negative for cough and shortness of breath.    Cardiovascular:  Negative for chest pain and leg swelling.   Gastrointestinal:  Negative for abdominal pain, blood in stool, constipation, diarrhea, nausea and vomiting.   Genitourinary:  Negative for dysuria.   Skin:  Positive for rash (scalp).   Neurological:  Negative for dizziness, syncope and light-headedness.   Psychiatric/Behavioral:  Negative for agitation and confusion.        Objective   Vitals:  /66 (BP Location: Left arm, Patient Position: Sitting, BP Cuff Size: Adult)   Pulse 57   Temp 36.3 °C (97.3 °F)   Ht 1.854 m (6' 1\")   Wt 58.1 kg (128 lb)   BMI 16.89 kg/m²       Physical Exam  Vitals and nursing note reviewed.   Constitutional:       General: He is not in acute distress.     Appearance: Normal appearance. He is not ill-appearing, toxic-appearing or diaphoretic.   HENT:      Head: Normocephalic and atraumatic.      Right Ear: Tympanic membrane normal.      Left Ear: Tympanic membrane normal. There is impacted cerumen.      Mouth/Throat:      Mouth: Mucous membranes are moist.      Pharynx: Oropharynx is clear. No oropharyngeal exudate.   Eyes:      Pupils: Pupils are equal, round, and reactive to light.   Cardiovascular:      Rate and Rhythm: Normal rate and regular rhythm.      Heart sounds: Normal heart sounds.   Pulmonary:      Effort: Pulmonary effort is normal. No respiratory distress.      Breath sounds: Normal breath sounds. No wheezing, rhonchi or rales.   Abdominal:      General: Bowel sounds are normal. There is no distension.      Palpations: Abdomen is soft. There is no mass.      Tenderness: There is no abdominal tenderness. There is no guarding.   Musculoskeletal:      Cervical back: Neck supple.      Right lower leg: No edema.      Left lower leg: No edema.   Lymphadenopathy:      Cervical: No cervical adenopathy.   Skin:     General: Skin is warm and dry.      Coloration: Skin is not jaundiced or pale.      Findings: Rash " (rash of the scalp flaky skin of scalp) present.   Neurological:      General: No focal deficit present.      Mental Status: He is alert and oriented to person, place, and time.      Cranial Nerves: No cranial nerve deficit.   Psychiatric:         Mood and Affect: Mood normal.         Behavior: Behavior normal.         Thought Content: Thought content normal.         Judgment: Judgment normal.         Assessment & Plan  Essential (primary) hypertension    Orders:    amLODIPine (Norvasc) 10 mg tablet; Take 1 tablet (10 mg) by mouth once daily.    bisoprolol (Zebeta) 5 mg tablet; Take 1 tablet (5 mg) by mouth 2 times a day.    losartan (Cozaar) 100 mg tablet; Take 1 tablet (100 mg) by mouth once daily.    CBC and Auto Differential; Future    Comprehensive metabolic panel; Future    Seborrheic dermatitis of scalp    Orders:    ketoconazole (NIZOral) 2 % shampoo; Apply topically 3 (three) times a week. USE ON HAIR THREE TIMES WEEKLY, LATHER IN AND LEAVE ON FOR FIVE TO TEN MINUTES BEFORE WASHING OUT.    Anemia, unspecified type    Orders:    CBC and Auto Differential; Future    Macrocytosis    Orders:    CBC and Auto Differential; Future    Vitamin D deficiency    Orders:    Vitamin D 25-Hydroxy,Total (for eval of Vitamin D levels); Future    Thyroid disorder screening    Orders:    TSH with reflex to Free T4 if abnormal; Future    Encounter for lipid screening for cardiovascular disease    Orders:    Lipid panel; Future    Essential hypertension, benign         Health maintenance examination         Impacted cerumen of left ear          Health maintenance examination: Patient is up-to-date on age and gender recommended screening.  Patient due for shingles vaccine and RSV vaccine which she defers    Hypertension: Chronic, stable continue current medication regimen    Seborrheic dermatitis of scalp: We have given him ketoconazole shampoo prescription    Anemia: Chronic, stable recommend referral to hematology due to  persistently low blood count as well as macrocytosis and abnormal reticulocyte count.  Patient defers at this time.    Vitamin D deficiency: Chronic, stable continue vitamin D    Impacted cerumen of left ear: We used a curette and irrigation to remove the cerumen impaction from the left ear patient tolerated procedure well without adverse effects.  Bilateral tympanic membranes intact    Underweight: Patient is underweight we discussed this may be related to his low red blood cell count it is unclear.  He remained stable in weight she has gained some weight we discussed starting him on a medication to help with weight gain such as Remeron or Megace but he defers at this time.

## 2025-02-18 NOTE — ASSESSMENT & PLAN NOTE
Orders:    amLODIPine (Norvasc) 10 mg tablet; Take 1 tablet (10 mg) by mouth once daily.    bisoprolol (Zebeta) 5 mg tablet; Take 1 tablet (5 mg) by mouth 2 times a day.    losartan (Cozaar) 100 mg tablet; Take 1 tablet (100 mg) by mouth once daily.    CBC and Auto Differential; Future    Comprehensive metabolic panel; Future

## 2025-03-10 ENCOUNTER — TELEPHONE (OUTPATIENT)
Dept: PRIMARY CARE | Facility: CLINIC | Age: 80
End: 2025-03-10
Payer: MEDICARE

## 2025-03-10 NOTE — TELEPHONE ENCOUNTER
Patient states that the Ketoconazole shampoo  is not helping his scalp.  He states that he thinks it is eczema and asking for something specifically for this be sent in.  Please advise.

## 2025-03-12 DIAGNOSIS — L21.9 SEBORRHEIC DERMATITIS: Primary | ICD-10-CM

## 2025-03-12 RX ORDER — CLOBETASOL PROPIONATE 0.05 G/100ML
SHAMPOO TOPICAL
Qty: 118 ML | Refills: 11 | Status: SHIPPED | OUTPATIENT
Start: 2025-03-12

## 2025-04-09 ENCOUNTER — TELEPHONE (OUTPATIENT)
Dept: PRIMARY CARE | Facility: CLINIC | Age: 80
End: 2025-04-09
Payer: MEDICARE

## 2025-04-09 NOTE — TELEPHONE ENCOUNTER
Patient is having swelling and sensitive when touched.  He is asking to see a ENT at [facility].  After talking to him more, it sounds more like infection/ cellulitis.  Advised he go to Urgent Care since our office is so far from him.  He needs to have this examined ASAP.  He agreed and will go to an Urgent care.

## 2025-04-10 ENCOUNTER — OFFICE VISIT (OUTPATIENT)
Dept: PRIMARY CARE | Facility: CLINIC | Age: 80
End: 2025-04-10
Payer: MEDICARE

## 2025-04-10 VITALS
TEMPERATURE: 97.2 F | BODY MASS INDEX: 17.2 KG/M2 | DIASTOLIC BLOOD PRESSURE: 65 MMHG | HEIGHT: 73 IN | HEART RATE: 53 BPM | WEIGHT: 129.8 LBS | SYSTOLIC BLOOD PRESSURE: 124 MMHG

## 2025-04-10 DIAGNOSIS — R19.8 DIFFICULTY SWALLOWING PILLS: ICD-10-CM

## 2025-04-10 DIAGNOSIS — I10 ESSENTIAL (PRIMARY) HYPERTENSION: ICD-10-CM

## 2025-04-10 DIAGNOSIS — R22.0 JAW SWELLING: Primary | ICD-10-CM

## 2025-04-10 PROCEDURE — G2211 COMPLEX E/M VISIT ADD ON: HCPCS | Performed by: INTERNAL MEDICINE

## 2025-04-10 PROCEDURE — 3078F DIAST BP <80 MM HG: CPT | Performed by: INTERNAL MEDICINE

## 2025-04-10 PROCEDURE — 3074F SYST BP LT 130 MM HG: CPT | Performed by: INTERNAL MEDICINE

## 2025-04-10 PROCEDURE — 99213 OFFICE O/P EST LOW 20 MIN: CPT | Performed by: INTERNAL MEDICINE

## 2025-04-10 PROCEDURE — 1159F MED LIST DOCD IN RCRD: CPT | Performed by: INTERNAL MEDICINE

## 2025-04-10 PROCEDURE — 1158F ADVNC CARE PLAN TLK DOCD: CPT | Performed by: INTERNAL MEDICINE

## 2025-04-10 PROCEDURE — 1036F TOBACCO NON-USER: CPT | Performed by: INTERNAL MEDICINE

## 2025-04-10 PROCEDURE — 1123F ACP DISCUSS/DSCN MKR DOCD: CPT | Performed by: INTERNAL MEDICINE

## 2025-04-10 PROCEDURE — 1160F RVW MEDS BY RX/DR IN RCRD: CPT | Performed by: INTERNAL MEDICINE

## 2025-04-10 ASSESSMENT — ENCOUNTER SYMPTOMS
VOMITING: 0
TROUBLE SWALLOWING: 0
WOUND: 0
FEVER: 0
AGITATION: 0
ABDOMINAL PAIN: 0
FACIAL SWELLING: 1
CONFUSION: 0
NAUSEA: 0
COUGH: 0
SHORTNESS OF BREATH: 0
CHILLS: 0
DIZZINESS: 0
SORE THROAT: 0

## 2025-04-10 ASSESSMENT — PATIENT HEALTH QUESTIONNAIRE - PHQ9
SUM OF ALL RESPONSES TO PHQ9 QUESTIONS 1 AND 2: 0
1. LITTLE INTEREST OR PLEASURE IN DOING THINGS: NOT AT ALL
2. FEELING DOWN, DEPRESSED OR HOPELESS: NOT AT ALL

## 2025-04-10 NOTE — PROGRESS NOTES
Subjective   Patient ID: Yohan Glez is a 80 y.o. male who presents for Ear Injury (Swelling and discomfort of right outer ear.  This started yesterday./He wants to discuss amlodipine.).    HPI patient is a 80-year-old male presents to the office today chief complaint of some swelling discomfort of the right angle of the mandible/neck region.  Denies any injury or trauma.  States he felt the area was swollen and then when he pushed on the area it would hurt.  No redness or open wound or drainage to the area.  States he noticed this yesterday.  He will put triamcinolone cream on the area because he has some history of dermatitis.  Denies any actual tooth pain.  States he is up-to-date with dental visits.  No difficulty opening or closing the mouth or swallowing.  Blood pressure in the office today 124/65.  Has not had any fever, chills, chest pain shortness of breath, nausea, vomiting diarrhea, abdominal pain.  No pain in the actual ear itself or the earlobe.    Patient also states has had some difficulty swallowing his amlodipine pills.  States the  changed and since that time the pills are more oblong.  He talk to the pharmacy about this and is going to go to different pharmacies to see if they have the same formulation that he was on in the past.    Review of Systems   Constitutional:  Negative for chills and fever.   HENT:  Positive for facial swelling (Swelling just inferior posterior to the angle of the jaw on the right). Negative for ear pain, sore throat and trouble swallowing.    Eyes:  Negative for visual disturbance.   Respiratory:  Negative for cough and shortness of breath.    Cardiovascular:  Negative for chest pain.   Gastrointestinal:  Negative for abdominal pain, nausea and vomiting.   Skin:  Negative for rash and wound.   Neurological:  Negative for dizziness.   Psychiatric/Behavioral:  Negative for agitation and confusion.        Objective   /65 (BP Location: Left arm,  "Patient Position: Sitting, BP Cuff Size: Adult)   Pulse 53   Temp 36.2 °C (97.2 °F)   Ht 1.854 m (6' 1\")   Wt 58.9 kg (129 lb 12.8 oz)   BMI 17.13 kg/m²     Physical Exam  Vitals and nursing note reviewed.   Constitutional:       General: He is not in acute distress.     Appearance: Normal appearance. He is not ill-appearing, toxic-appearing or diaphoretic.   HENT:      Head: Normocephalic and atraumatic.      Comments: Patient has some mild tenderness along the neck laterally on the right just inferior posterior to the angle of the mandible on the right.  No redness or drainage.  No open wound.  I do not appreciate any masses palpated.  Negative TMJ evaluation.     Right Ear: Tympanic membrane, ear canal and external ear normal.      Left Ear: Tympanic membrane, ear canal and external ear normal.      Mouth/Throat:      Mouth: Mucous membranes are moist.      Pharynx: Oropharynx is clear. No oropharyngeal exudate or posterior oropharyngeal erythema.   Neck:      Vascular: No carotid bruit.   Cardiovascular:      Rate and Rhythm: Normal rate and regular rhythm.      Heart sounds: Normal heart sounds.   Pulmonary:      Effort: Pulmonary effort is normal. No respiratory distress.      Breath sounds: Normal breath sounds. No stridor. No wheezing, rhonchi or rales.   Chest:      Chest wall: No tenderness.   Musculoskeletal:      Cervical back: Neck supple.      Right lower leg: No edema.      Left lower leg: No edema.   Lymphadenopathy:      Cervical: No cervical adenopathy.   Skin:     General: Skin is warm and dry.      Coloration: Skin is not pale.   Neurological:      General: No focal deficit present.      Mental Status: He is alert and oriented to person, place, and time.   Psychiatric:         Mood and Affect: Mood normal.         Behavior: Behavior normal.         Thought Content: Thought content normal.         Judgment: Judgment normal.         Assessment/Plan   Problem List Items Addressed This Visit  "            ICD-10-CM    Essential (primary) hypertension I10    Jaw swelling - Primary R22.0    Difficulty swallowing pills R19.8     Jaw swelling: Patient's right angle of the mandible patient had noticed some swelling discomfort to this area.  On physical exam I do not feel any masses or evidence of infection with fluctuance.  Differential includes TMJ syndrome, dental abnormality, eustachian tube dysfunction, adenopathy.  He is not having any fevers or chills.  No redness or swelling.  Advised him to follow-up with his dentist for an exam.  I advised if symptoms are not improved to follow-up for recheck at this time he may apply warm and cool compresses to the area.  The area has improved.  This also could be eustachian tube dysfunction so advised him he may use Flonase 1 spray in each nostril twice daily.  Advised patient to follow-up if symptoms are not improved or worsening especially if he develops any fever swelling of the area redness open wound, drainage    Hypertension/difficulty swallowing pills: I advised the patient continue his current medication regimen for hypertension as his blood pressure is well-controlled.  He verbalizes he has difficulty swallowing his amlodipine pill because the  change in the pill is now oval-shaped and larger.  I advise he work with his pharmacy and may contact surrounding pharmacies to see if they have a different size pill.  Also to inquire with the pharmacist if it is okay to cut the medication or crush it.  Patient verbalized understanding.

## 2025-06-17 DIAGNOSIS — I10 ESSENTIAL (PRIMARY) HYPERTENSION: ICD-10-CM

## 2025-06-18 RX ORDER — AMLODIPINE BESYLATE 10 MG/1
10 TABLET ORAL DAILY
Qty: 90 TABLET | Refills: 1 | Status: SHIPPED | OUTPATIENT
Start: 2025-06-18

## 2025-08-06 LAB — NON-UH HIE MISC SENDOUT: NORMAL

## 2025-08-11 LAB
NON-UH HIE A/G RATIO: 1.3
NON-UH HIE ALB: 3.9 G/DL (ref 3.4–5)
NON-UH HIE ALK PHOS: 61 UNIT/L (ref 45–117)
NON-UH HIE BASO COUNT: 0.03 X1000 (ref 0–0.2)
NON-UH HIE BASOS %: 0.5 %
NON-UH HIE BILIRUBIN, TOTAL: 0.3 MG/DL (ref 0.3–1.2)
NON-UH HIE BUN/CREAT RATIO: 25
NON-UH HIE BUN: 25 MG/DL (ref 9–23)
NON-UH HIE CALCIUM: 9.2 MG/DL (ref 8.7–10.4)
NON-UH HIE CALCULATED LDL CHOLESTEROL: 78 MG/DL (ref 60–130)
NON-UH HIE CALCULATED OSMOLALITY: 281 MOSM/KG (ref 275–295)
NON-UH HIE CHLORIDE: 101 MMOL/L (ref 98–107)
NON-UH HIE CHOLESTEROL: 157 MG/DL (ref 100–200)
NON-UH HIE CO2, VENOUS: 32 MMOL/L (ref 20–31)
NON-UH HIE CREATININE: 1 MG/DL (ref 0.6–1.1)
NON-UH HIE DIFF?: ABNORMAL
NON-UH HIE EOS COUNT: 0.24 X1000 (ref 0–0.5)
NON-UH HIE EOSIN %: 4.5 %
NON-UH HIE GFR AA: >60
NON-UH HIE GLOBULIN: 3 G/DL
NON-UH HIE GLOMERULAR FILTRATION RATE: >60 ML/MIN/1.73M?
NON-UH HIE GLUCOSE: 79 MG/DL (ref 74–106)
NON-UH HIE GOT: 32 UNIT/L (ref 15–37)
NON-UH HIE GPT: 30 UNIT/L (ref 10–49)
NON-UH HIE HCT: 35.2 % (ref 41–52)
NON-UH HIE HDL CHOLESTEROL: 70 MG/DL (ref 40–60)
NON-UH HIE HGB: 12.1 G/DL (ref 13.5–17.5)
NON-UH HIE INSTR WBC: 5.3
NON-UH HIE K: 4.2 MMOL/L (ref 3.5–5.1)
NON-UH HIE LYMPH %: 18.7 %
NON-UH HIE LYMPH COUNT: 0.98 X1000 (ref 1.2–4.8)
NON-UH HIE MCH: 35.7 PG (ref 27–34)
NON-UH HIE MCHC: 34.3 G/DL (ref 32–37)
NON-UH HIE MCV: 104 FL (ref 80–100)
NON-UH HIE MONO %: 10.2 %
NON-UH HIE MONO COUNT: 0.54 X1000 (ref 0.1–1)
NON-UH HIE MPV: 7.8 FL (ref 7.4–10.4)
NON-UH HIE NA: 139 MMOL/L (ref 135–145)
NON-UH HIE NEUTROPHIL %: 66.1 %
NON-UH HIE NEUTROPHIL COUNT (ANC): 3.48 X1000 (ref 1.4–8.8)
NON-UH HIE NUCLEATED RBC: 0 /100WBC
NON-UH HIE PLATELET: 227 X10 (ref 150–450)
NON-UH HIE RBC: 3.39 X10 (ref 4.7–6.1)
NON-UH HIE RDW: 13 % (ref 11.5–14.5)
NON-UH HIE TOTAL CHOL/HDL CHOL RATIO: 2.2
NON-UH HIE TOTAL PROTEIN: 6.9 G/DL (ref 5.7–8.2)
NON-UH HIE TRIGLYCERIDES: 44 MG/DL (ref 30–150)
NON-UH HIE TSH: 1.87 UIU/ML (ref 0.55–4.78)
NON-UH HIE VIT D 25: 30 NG/ML
NON-UH HIE WBC: 5.3 X10 (ref 4.5–11)

## 2025-08-18 ENCOUNTER — RESULTS FOLLOW-UP (OUTPATIENT)
Dept: PRIMARY CARE | Facility: CLINIC | Age: 80
End: 2025-08-18
Payer: MEDICARE

## 2025-08-19 ENCOUNTER — APPOINTMENT (OUTPATIENT)
Dept: PRIMARY CARE | Facility: CLINIC | Age: 80
End: 2025-08-19
Payer: MEDICARE

## 2025-08-19 ENCOUNTER — HOSPITAL ENCOUNTER (OUTPATIENT)
Dept: RADIOLOGY | Facility: CLINIC | Age: 80
Discharge: HOME | End: 2025-08-19
Payer: MEDICARE

## 2025-08-19 VITALS
WEIGHT: 121 LBS | DIASTOLIC BLOOD PRESSURE: 77 MMHG | SYSTOLIC BLOOD PRESSURE: 129 MMHG | HEART RATE: 58 BPM | HEIGHT: 73 IN | BODY MASS INDEX: 16.04 KG/M2

## 2025-08-19 DIAGNOSIS — H61.23 BILATERAL IMPACTED CERUMEN: ICD-10-CM

## 2025-08-19 DIAGNOSIS — R63.6 UNDERWEIGHT: ICD-10-CM

## 2025-08-19 DIAGNOSIS — I10 ESSENTIAL (PRIMARY) HYPERTENSION: ICD-10-CM

## 2025-08-19 DIAGNOSIS — R10.33 PERIUMBILICAL ABDOMINAL PAIN: Primary | ICD-10-CM

## 2025-08-19 DIAGNOSIS — R63.4 UNINTENTIONAL WEIGHT LOSS: ICD-10-CM

## 2025-08-19 DIAGNOSIS — R10.33 PERIUMBILICAL ABDOMINAL PAIN: ICD-10-CM

## 2025-08-19 PROCEDURE — 71046 X-RAY EXAM CHEST 2 VIEWS: CPT | Performed by: STUDENT IN AN ORGANIZED HEALTH CARE EDUCATION/TRAINING PROGRAM

## 2025-08-19 PROCEDURE — 3074F SYST BP LT 130 MM HG: CPT | Performed by: INTERNAL MEDICINE

## 2025-08-19 PROCEDURE — 1160F RVW MEDS BY RX/DR IN RCRD: CPT | Performed by: INTERNAL MEDICINE

## 2025-08-19 PROCEDURE — 69210 REMOVE IMPACTED EAR WAX UNI: CPT | Performed by: INTERNAL MEDICINE

## 2025-08-19 PROCEDURE — 74177 CT ABD & PELVIS W/CONTRAST: CPT

## 2025-08-19 PROCEDURE — 71046 X-RAY EXAM CHEST 2 VIEWS: CPT

## 2025-08-19 PROCEDURE — 3078F DIAST BP <80 MM HG: CPT | Performed by: INTERNAL MEDICINE

## 2025-08-19 PROCEDURE — 2550000001 HC RX 255 CONTRASTS: Performed by: INTERNAL MEDICINE

## 2025-08-19 PROCEDURE — 1159F MED LIST DOCD IN RCRD: CPT | Performed by: INTERNAL MEDICINE

## 2025-08-19 PROCEDURE — 99213 OFFICE O/P EST LOW 20 MIN: CPT | Performed by: INTERNAL MEDICINE

## 2025-08-19 PROCEDURE — G2211 COMPLEX E/M VISIT ADD ON: HCPCS | Performed by: INTERNAL MEDICINE

## 2025-08-19 PROCEDURE — 1036F TOBACCO NON-USER: CPT | Performed by: INTERNAL MEDICINE

## 2025-08-19 RX ORDER — LOSARTAN POTASSIUM 100 MG/1
100 TABLET ORAL DAILY
Qty: 90 TABLET | Refills: 1 | Status: SHIPPED | OUTPATIENT
Start: 2025-08-19 | End: 2026-02-15

## 2025-08-19 RX ORDER — SUCRALFATE 1 G/1
1 TABLET ORAL 2 TIMES DAILY
COMMUNITY
Start: 2025-08-15

## 2025-08-19 RX ORDER — BISOPROLOL FUMARATE 10 MG/1
10 TABLET, FILM COATED ORAL DAILY
Qty: 90 TABLET | Refills: 1 | Status: SHIPPED | OUTPATIENT
Start: 2025-08-19 | End: 2026-02-15

## 2025-08-19 RX ORDER — BISOPROLOL FUMARATE 5 MG/1
10 TABLET, FILM COATED ORAL DAILY
Qty: 180 TABLET | Refills: 1 | Status: CANCELLED | OUTPATIENT
Start: 2025-08-19

## 2025-08-19 RX ADMIN — IOHEXOL 75 ML: 350 INJECTION, SOLUTION INTRAVENOUS at 14:33

## 2025-08-19 ASSESSMENT — ENCOUNTER SYMPTOMS
BLOOD IN STOOL: 0
DIZZINESS: 0
VOMITING: 0
FEVER: 0
CONSTIPATION: 1
AGITATION: 0
DIARRHEA: 0
DYSURIA: 0
SHORTNESS OF BREATH: 0
COUGH: 0
ABDOMINAL PAIN: 1
SORE THROAT: 0
CONFUSION: 0
CHILLS: 0
LIGHT-HEADEDNESS: 0
PALPITATIONS: 0
NAUSEA: 0

## 2025-08-19 ASSESSMENT — PATIENT HEALTH QUESTIONNAIRE - PHQ9
1. LITTLE INTEREST OR PLEASURE IN DOING THINGS: NOT AT ALL
SUM OF ALL RESPONSES TO PHQ9 QUESTIONS 1 AND 2: 0
2. FEELING DOWN, DEPRESSED OR HOPELESS: NOT AT ALL

## 2025-08-25 ENCOUNTER — TELEPHONE (OUTPATIENT)
Dept: PRIMARY CARE | Facility: CLINIC | Age: 80
End: 2025-08-25
Payer: MEDICARE

## 2025-08-29 DIAGNOSIS — R93.89 ABNORMAL CHEST X-RAY: ICD-10-CM

## 2025-08-29 DIAGNOSIS — R63.4 UNINTENTIONAL WEIGHT LOSS: Primary | ICD-10-CM
